# Patient Record
(demographics unavailable — no encounter records)

---

## 2022-06-24 RX ORDER — BUSPIRONE HYDROCHLORIDE 10 MG/1
TABLET ORAL
COMMUNITY

## 2022-06-24 RX ORDER — ALBUTEROL SULFATE 90 UG/1
AEROSOL, METERED RESPIRATORY (INHALATION)
COMMUNITY

## 2022-07-31 LAB
ANION GAP SERPL CALC-SCNC: 9 MMOL/L (ref 2–17)
BASOPHILS # BLD AUTO: 0 X10E3/MCL (ref 0–0.2)
BASOPHILS NFR BLD AUTO: 0.3 % (ref 0–2)
BUN SERPL-MCNC: 16 MG/DL (ref 6–20)
CALCIUM SERPL-MCNC: 8.9 MG/DL (ref 8.6–10)
CHLORIDE SERPL-SCNC: 100 MMOL/L (ref 98–107)
CREAT SERPL-MCNC: 0.6 MG/DL (ref 0.5–1)
DEPRECATED HCO3 PLAS-SCNC: 28 MMOL/L (ref 22–29)
EOSINOPHIL # BLD AUTO: 0 X10E3/MCL (ref 0–0.5)
EOSINOPHIL NFR BLD AUTO: 0 % (ref 0–7)
ERYTHROCYTE [DISTWIDTH] IN BLOOD BY AUTOMATED COUNT: 12.9 % (ref 11–16)
GFR SERPL CREATININE-BSD FRML MDRD: 113 ML/MIN/1.73M²
GLUCOSE SERPL-MCNC: 155 MG/DL (ref 70–99)
HCT VFR BLD AUTO: 40 % (ref 34–47)
HGB BLD-MCNC: 13.2 G/DL (ref 11.5–15.7)
IMMATURE GRANS (ABS): 0.04 X10E3/MCL (ref 0–0.06)
IMMATURE GRANULOCYTES: 0.4 % (ref 0–0.6)
LACTIC ACID: 1.2 MMOL/L (ref 0.5–2)
LYMPHOCYTES # SPEC AUTO: 1.2 X10E3/MCL (ref 1–3.2)
LYMPHOCYTES NFR BLD AUTO: 11.2 % (ref 15–45)
MCH RBC QN AUTO: 28.7 PG (ref 27–34.5)
MCHC RBC AUTO-ENTMCNC: 33 G/DL (ref 32–36)
MCV RBC AUTO: 87 FL (ref 81–99)
MONOCYTES # BLD AUTO: 0.4 X10E3/MCL (ref 0.3–1)
MONOCYTES NFR BLD AUTO: 3.7 % (ref 4–12)
NEUTROPHILS # BLD AUTO: 9.1 X10E3/MCL (ref 1.6–7.3)
NEUTROPHILS NFR BLD AUTO: 84.4 % (ref 42–74)
OSMOLALITY SERPL CALC.SUM OF ELEC: 278 MOSM/KG (ref 270–287)
PLATELET # BLD AUTO: 286 X10E3/MCL (ref 140–440)
PMV BLD AUTO: 11.6 FL (ref 7.2–13.2)
POTASSIUM SERPL-SCNC: 4.1 MMOL/L (ref 3.5–5.3)
PREGNANCY TEST URINE, POC: NEGATIVE
RBC # BLD AUTO: 4.6 X10E6/MCL (ref 3.6–5.2)
SODIUM SERPL-SCNC: 137 MMOL/L (ref 135–145)
WBC # BLD AUTO: 10.8 X10E3/MCL (ref 3.8–10.6)

## 2022-08-01 LAB — FINAL REPORT: NORMAL

## 2022-08-05 LAB — FINAL REPORT: NORMAL

## 2022-10-18 NOTE — ED NOTES
of Problem:   Postoperative nausea and vomiting ; Recorder:   ALYSSA MCKEON JILL A; Confirmation:   Confirmed ; Classification:   Patient Stated ; Code:   [id number] ; Contributor System:   PowerChart ; Last Updated:   2021 11:24 EDT ; Life Cycle Date:   2021 ; Life Cycle Status:   Active ; Vocabulary:   SNOMED CT        Right kidney stone (SNOMED CT  :138730280 )  Name of Problem:   Right kidney stone ; Recorder:   ALYSSA MCKEON JILL A; Confirmation:   Confirmed ; Classification:   Patient Stated ; Code:   [id number] ; Contributor System:   Code Blue ; Last Updated:   2021 11:23 EDT ; Life Cycle Date:   2021 ; Life Cycle Status:   Active ; Vocabulary:   SNOMED CT        Seasonal nasal allergies (SNOMED CT  :AZEGxwEmLzUcjclowKgAAg )  Name of Problem:   Seasonal nasal allergies ; Recorder:   ALYSSA MCKEON JILL A; Confirmation:   Confirmed ; Classification:   Patient Stated ; Code:   AZEGxwEmLzUcjclowKgAAg ; Contributor System:   Code Blue ; Last Updated:   2021 11:23 EDT ; Life Cycle Date:   2021 ; Life Cycle Status:   Active ; Vocabulary:   SNOMED CT          Diagnoses(Active)    Facial swelling  Date:   2022 ; Diagnosis Type:   Reason For Visit ; Confirmation:   Complaint of ; Clinical Dx:   Facial swelling ; Classification:   Medical ; Clinical Service:   Emergency medicine ; Code:   PNED ; Probability:   0 ; Diagnosis Code:   [id number]             -    Procedure History   (As Of: 2022 21:25:10 EDT)     Procedure Dt/Tm:    ; Anesthesia Minutes:   0 ; Procedure Name:    delivery ; Procedure Minutes:   0            Procedure Dt/Tm:   544 ; Anesthesia Minutes:   0 ; Procedure Name:   dilatation and currettage x3 ; Procedure Minutes:   0            Procedure Dt/Tm:   2021 13:27:00 EDT ; Location:   [facility] ; Provider:   Reilly BRADLEY;  Anesthesia Type:   General ; :   Clent Kocher; Anesthesia Minutes:   0 ; Procedure Name:   Lithotripsy (Right) ; Procedure Minutes:   32 ; Comments:     4/16/2021 14:08 EDT - ALYSSA QUIROGA, SOLITARIO AYON  auto-populated from documented surgical case ; Clinical Service:   Surgery            Procedure Dt/Tm:   2014 ; Anesthesia Minutes:   0 ; Procedure Name:   BILATERAL CARPAL [address] ; Procedure Minutes:   0            Procedure Dt/Tm:   2008 ; Anesthesia Minutes:   0 ; Procedure Name:   [address] ; Procedure Minutes:   0            Procedure Dt/Tm:   8/7/2021 11:15:00 EDT ; Location:   [address] [place] [address] ; Provider:   Dinah GO; Anesthesia Type:   General ; :   Marianne MACIEL; Anesthesia Minutes:   0 ; Procedure Name:   Cholecystectomy Laparoscopic ; Procedure Minutes:   75 ; Comments:     8/7/2021 12:39 EDT - Dany Christianson RN, Laquita MARMOLEJO  auto-populated from documented surgical case ; Clinical Service:   Surgery            [facility] Fall Risk Assessment Tool   Hx of falling last 3 months ED Fall :   No   Patient confused or disoriented ED Fall :   No   Patient intoxicated or sedated ED Fall :   No   Patient impaired gait ED Fall :   No   Use a mobility assistance device ED Fall :   No   Patient altered elimination ED Fall :   No   [facility] ED Fall Score :   0    Jolantarudy Little - 7/31/2022 21:23 EDT   ED Advance Directive   Advance Directive :   No   Thong Rosado RN, Shawnee Tyson - 7/31/2022 21:23 EDT   Med Hx   Medication List   (As Of: 7/31/2022 21:25:10 EDT)   Normal Order    iopamidol 61% Inj Soln 100 mL  :   iopamidol 61% Inj Soln 100 mL ; Status:   Ordered ; Ordered As Mnemonic:   Isovue-300 ; Simple Display Line:   100 mL, IV Contrast, Once ; Ordering Provider:   Priscila PILLAI; Catalog Code:   iopamidol ; Order Dt/Tm:   7/31/2022 20:52:21 EDT            Prescription/Discharge Order    EPINEPHrine  :   EPINEPHrine ; Status:   Prescribed ; Ordered As Mnemonic:   EpiPen 2-Lev 0.3 mg injectable kit ;  Simple Display Line:   0.3 mg, IM, Once, PRN: itching/allergic reaction, 1 EA, 0 Refill(s) ; Ordering Provider:   Norma MACIEL; Catalog Code:   EPINEPHrine ; Order Dt/Tm:   7/29/2022 08:53:47 EDT          predniSONE  :   predniSONE ; Status:   Prescribed ; Ordered As Mnemonic:   predniSONE 20 mg oral tablet ; Simple Display Line:   40 mg, 2 tabs, Oral, Daily, for 5 days, 10 tabs, 0 Refill(s) ; Ordering Provider:   Norma MACIEL; Catalog Code:   predniSONE ; Order Dt/Tm:   7/29/2022 08:53:38 EDT            Home Meds    cyanocobalamin  :   cyanocobalamin ; Status:   Documented ; Ordered As Mnemonic:   Vitamin B-12 (cyanocobalamin) 1000 mcg/mL injectable solution ; Simple Display Line:   INJECT ONE ML INTRAMUSCULARLY ONCE A MONTH ; Catalog Code:   cyanocobalamin ; Order Dt/Tm:   7/29/2022 08:45:43 EDT          traZODone  :   traZODone ; Status:   Documented ; Ordered As Mnemonic:   traZODone 50 mg oral tablet ; Simple Display Line:   50 mg, 1 tabs, TAKE 1 OR 2 Tablets BY MOUTH AT BEDTIME AS NEEDED FOR SLEEP ; Catalog Code:   traZODone ; Order Dt/Tm:   7/29/2022 08:45:43 EDT          albuterol  :   albuterol ; Status:   Documented ; Ordered As Mnemonic:   Ventolin HFA 90 mcg/inh inhalation aerosol ; Simple Display Line:   1 puffs, Inhale, Once, PRN: for wheezing, 18 g, 0 Refill(s) ; Ordering Provider:   Zulma Ndiaye; Catalog Code:   albuterol ; Order Dt/Tm:   7/29/2022 08:45:43 EDT          PARoxetine  :   PARoxetine ; Status:   Documented ; Ordered As Mnemonic:   PARoxetine 20 mg oral tablet ; Simple Display Line:   20 mg, 1 tabs, Oral, Daily, 30 tabs, 0 Refill(s) ; Catalog Code:   PARoxetine ; Order Dt/Tm:   7/29/2022 08:45:43 EDT ; Comment:   SOUND ALIKE / LOOK ALIKE - VERIFY DRUG          albuterol  :   albuterol ; Status:   Documented ; Ordered As Mnemonic:   albuterol ; Simple Display Line:   2.5 mg, Inhale, q6hr, PRN: shortness of breath/wheezing, 0 Refill(s) ;  Catalog Code:   albuterol ; Order Dt/Tm:   4/14/2021 11:23:26 EDT

## 2022-10-18 NOTE — ED NOTES
ED Patient Summary       ;          [facility]  [address], [place], [address] [place] [address]  [phone]  Discharge Instructions (Patient)  Jesus Zambrano  :  1977                   MRN: [id number]                   FIN: [id number]  Reason For Visit: Facial swelling; ABCESS IN MOUTH  Final Diagnosis: Abscess of cheek; Dental abscess     Visit Date: 2022 20:18:00  Address: [address] [place] [address].  Phone: [phone]     Emergency Department Providers:         Primary Physician:      [facility] would like to thank you for allowing us to assist you with your healthcare needs. The following includes patient education materials and information regarding your injury/illness. Follow-up Instructions: You were seen today on an emergency basis. Please contact your primary care doctor for a follow up appointment. If you received a referral to a specialist doctor, it is important you follow-up as instructed. It is important that you call your follow-up doctor to schedule and confirm the location of your next appointment. Your doctor may practice at multiple locations. The office location of your follow-up appointment may be different to the one written on your discharge instructions. If you do not have a primary care doctor, please call [phone] for help in finding a Andrea Meza. [facility] Provider. For help in finding a specialist doctor, please call ..[phone]. If your condition gets worse before your follow-up with your primary care doctor or specialist, please return to the Emergency Department. Coronavirus 2019 (COVID-19) Reminders:     Patients age 15 - 24, with parental consent, and over age 25 can make an appointment for a COVID-19 vaccine. Patients can contact their [facility] doctors' offices to schedule an appointment to receive the COVID-19 vaccine. Patients who do not have a John Caldera physician can call [phone] to schedule vaccination appointments. Follow Up Appointments:  Primary Care Provider:     Name: Jules Recinos [facility]     Phone: [phone]                 With: Address: When:   Primary care/ clinic  Within 3 to 5 days   Comments: Thank you so much for visiting with us today. You have had a screening emergency medical exam and to this point, no emergent condition has been identified. It is   important to realize that of course your visit today is simply a moment in time and that your medical condition can certainly change and bcome worse, necessitating re-   evaluation in an urgent or emergent manner. It is your responsibility to seek care in such instances. It is also your responsibility to follow up with your primary care   provider to discuss your Emergency Department visit and to go over any and all testing that was incurred during your Emergency Department visit. We have made you   aware of any pertinent results at this visit, however this may not have been comprehensive. Lack of an acute emergency condition does not mean that there is not a   problem, nor does it replace a thorough exam performed by a primary care physician. It is your responsibility to follow your discharge instructions as given, to follow up   with any other physicians as indicated, and to return to the Emergency Department as instructed. Thank you again for visiting with us today, please let us know if you   have any further questions. Please follow-up with your primary care provider over the next 3-5 days. Please return to the ER if symptoms change, worsen, or for any new concern. With: Address: When:   [address] [place] [address] [address]  [phone] Business (1) Within 2 to 3 days   Comments:   Please refer to the instruction pages in regards to free/low-cost dental clinics.  As we discussed you will have to have the fifth tooth of the mouth removed to prevent further dental abscess. Use antibiotics as prescribed. Continue to use ibuprofen, and your steroids at home. Return to the ER if symptoms change or worsen              [address] SERVICES%>             New Medications  Printed Prescriptions  clindamycin (clindamycin 300 mg oral capsule) 1 Tabs Oral (given by mouth) 4 times a day for 7 Days. Refills: 0. Last Dose:____________________  fluconazole (Diflucan 150 mg oral tablet) 1 Tabs Oral (given by mouth) every week for 2 Weeks. Refills: 0. Last Dose:____________________  Medications that have not changed  Other Medications  albuterol 2.5 Milligram Inhale (breathe in) every 6 hours as needed shortness of breath/wheezing. Last Dose:____________________  albuterol (Ventolin HFA 90 mcg/inh inhalation aerosol) 1 Puffs Inhale (breathe in) once as needed for wheezing. Last Dose:____________________  cyanocobalamin (Vitamin B-12 (cyanocobalamin) 1000 mcg/mL injectable solution) INJECT ONE ML INTRAMUSCULARLY ONCE A MONTH. Last Dose:____________________  EPINEPHrine (EpiPen 2-Lev 0.3 mg injectable kit) 0.3 Milligram Intramuscular (in a muscle) once as needed itching/allergic reaction. Refills: 0. Last Dose:____________________  PARoxetine (PARoxetine 20 mg oral tablet) 1 Tabs Oral (given by mouth) every day., SOUND ALIKE / LOOK ALIKE - VERIFY DRUG  Last Dose:____________________  predniSONE (predniSONE 20 mg oral tablet) 2 Tabs Oral (given by mouth) every day for 5 Days. Refills: 0. Last Dose:____________________  traZODone (traZODone 50 mg oral tablet) 1 Tabs. TAKE 1 OR 2 Tablets BY MOUTH AT BEDTIME AS NEEDED FOR SLEEP.   Last Dose:____________________      Allergy Info: codeine     Discharge Additional Information          Discharge Patient 07/31/22 22:54:00 EDT      Patient Education Materials:                  [facility]  (E.C.C.O) [id number] [facility] [address], [address], [address]  YP-  Monday & Tuesday 10:00am - 4:00pm  Thursday 4:00pm - 7:00pm             Clinics by appointment only    · Lexi Garcia [address]  (also [address] speaking service)   [address] [phone]  [address] employed or residents only (uninsured)  Mon-Thurs, 8:30am-4:00pm.   Fri, 9am-11am (no dentist on site, Q&A session only),  Tue, 3:30pm-8:30pm (emergencies only, first 10 patients only)     · [facility]  [address]  [address] [facility] [address]  [place]-  Thurs, 5pm-9pm, no appointments, seen on order of arrival, extractions only    · Tanner Schroeder [id number] [facility], [phone]; option 1  Mon-Fri, 8:45am-1245pm, except Tue. Hours of operation depend on school semester. After hours emergencies call [phone] ask for dental resident on call. · [facility]  (also [address] speaking service)  [place] [address], [address] [place] [address] [phone]  For children 3-18yrs only without insurance or Medicaid  Contact clinic for hours (hours of operation depend on school  semester), weekends only by appointment    · [address] [facility] 5pm-9pm.  Uninsured and low income residents of [facility],  and [facility]. · [place] [facility] [place] [address] [place]-. Fee based care. Dental Abscess      A dental abscess is an area of pus in or around a tooth. It comes from an infection. It can cause pain and other symptoms. Treatment will help with symptoms and prevent the infection from spreading.       Follow these instructions at home:    Medicines     Take over-the-counter and prescription medicines only as told by your dentist.     If you were prescribed an antibiotic medicine, take it as told by your dentist. Donell Garcia not stop taking it even if you start to feel better. If you were prescribed a gel that has numbing medicine in it, use it exactly as told. Do not drive or use heavy machinery (like a ) while taking prescription pain medicine. General instructions       Rinse out your mouth often with salt water. ? To make salt water, dissolve ??1 tsp of salt in 1 cup of warm water. Eat a soft diet while your mouth is healing. Drink enough fluid to keep your urine pale yellow. Do not apply heat to the outside of your mouth. Do not use any products that contain nicotine or tobacco. These include cigarettes and e-cigarettes. If you need help quitting, ask your doctor. Keep all follow-up visits as told by your dentist. This is important. Prevent an abscess     Brush your teeth every morning and every night. Use fluoride toothpaste. Floss your teeth each day. Get dental cleanings as often as told by your dentist.     Think about getting dental sealant put on teeth that have deep holes (decay). Drink water that has fluoride in it. ? Most tap water has fluoride. ? Check the label on bottled water to see if it has fluoride in it. Drink water instead of sugary drinks. Eat healthy meals and snacks. Wear a mouth guard or face shield when you play sports. Contact a doctor if:     Your pain is worse, and medicine does not help. Get help right away if:     You have a fever or chills. Your symptoms suddenly get worse. You have a very bad headache. You have problems breathing or swallowing. You have trouble opening your mouth. You have swelling in your neck or close to your eye. Summary     A dental abscess is an area of pus in or around a tooth. It is caused by an infection.      Treatment will help with symptoms and prevent the infection from spreading. Take over-the-counter and prescription medicines only as told by your dentist.     To prevent an abscess, take good care of your teeth. Brush your teeth every morning and night. Use floss every day. Get dental cleanings as often as told by your dentist.      This information is not intended to replace advice given to you by your health care provider. Make sure you discuss any questions you have with your health care provider. Document Revised: 07/09/2021 Document Reviewed: 07/09/2021  Bret Patient Education ? [facility]      ---------------------------------------------------------------------------------------------------------------------  [facility] allows patients to review your COVID and other test results as well as discharge documents from any [facility], Emergency Department, surgical center or outpatient lab. Test results are typically available 36 hours after the test is completed. [address] encourages you to self-enroll in the [facility] Patient Portal.     To begin your self-enrollment process, please visit [email or web address] Under [facility], click on Sign up now. NOTE: You must be 16 years and older to use [facility] Self-Enroll online. If you are a parent, caregiver, or guardian; you need an invite to access your childs or dependents health records. To obtain an invite, contact the Medical Records department at [phone] Monday through Friday, 8-4:30, select option 3 . If we receive your call afterhours, we will return your call the next business day. If you have issues trying to create or access your account, contact [employer] at [phone] available 7 days a week 24 hours a day.      Comment:

## 2022-10-18 NOTE — ED NOTES
ED Triage Note       ED Triage Adult Entered On:  7/31/2022 20:30 EDT    Performed On:  7/31/2022 20:26 EDT by Khalif PAIGE               Triage   Numeric Rating Pain Scale :   6   Chief Complaint :   Seen Friday for right sided facial edema. Reports mild improvement since but states she continues to be painful on right cheek bone with \"pus pocket\" inside. No known dental issues. Reports continued blurry vision in right eye.  Airway patent, no distress   [place] [facility]) Mode of Arrival :   Private vehicle   Infectious Disease Documentation :   Document assessment   Temperature Oral :   37.4 degC(Converted to: 99.3 degF)  (HI)    Heart Rate Monitored :   85 bpm   Respiratory Rate :   18 br/min   Systolic Blood Pressure :   150 mmHg (HI)    Diastolic Blood Pressure :   86 mmHg   SpO2 :   97 %   Oxygen Therapy :   Room air   Patient presentation :   None of the above   Chief Complaint or Presentation suggest infection :   Yes   Dosing Weight Obtained By :   Patient stated   Weight Dosing :   130 kg(Converted to: 286 lb 10 oz)    Height :   165 cm(Converted to: 5 ft 5 in)    Body Mass Index Dosing :   48 kg/m2   Khalif PAIGE - 7/31/2022 20:26 EDT   DCP GENERIC CODE   Tracking Acuity :   3   Tracking Group :   ED [facility] Tracking Group   Khalif PAIGE - 7/31/2022 20:26 EDT   ED General Section :   Document assessment   Pregnancy Status :   Patient denies   Last Menstrual Period :   7/29/2022 EDT   ED Allergies Section :   Document assessment   ED Reason for Visit Section :   Document assessment   Khalif PAIGE - 7/31/2022 20:26 EDT   ID Risk Screen Symptoms   Recent Travel History :   No recent travel   TB Symptom Screen :   No symptoms   Last 90 days COVID-19 ID :   No   Close Contact with COVID-19 ID :   No   Last 14 days COVID-19 ID :   No   Khalif PAIGE - 7/31/2022 20:26 EDT   Allergies   (As Of: 7/31/2022 20:30:49 EDT)   Allergies (Active)   codeine  Estimated Onset Date: Unspecified ; Reactions:   Nausea & vomiting ; Created By:   Shai Harrington RN, JILL A; Reaction Status:   Active ; Category:   Drug ; Substance:   codeine ; Type: Allergy ; Severity:   Moderate ; Updated By:   Shai Harrington RN, JILL A; Reviewed Date:   7/29/2022 8:53 EDT      traZODone  Estimated Onset Date:   Unspecified ; Reactions:   Facial swelling ; Created By:   Rip Clay; Reaction Status:   Active ; Category:   Drug ; Substance:   traZODone ; Type: Allergy ; Severity:   Moderate ; Updated By:   Rip Clay; Reviewed Date:   7/29/2022 8:53 EDT        Psycho-Social   Last 3 mo, thoughts killing self/others :   Patient denies   Right click within box for Suspected Abuse policy link. :   None   Feels Safe Where Live :   Yes   ED Behavioral Activity Rating Scale :   4 - Quiet and awake (normal level of activity)   Chelsea PAIGE - 7/31/2022 20:26 EDT   ED Reason for Visit   (As Of: 7/31/2022 20:30:49 EDT)   Problems(Active)    Arthritis (SNOMED CT  :0409911 )  Name of Problem:   Arthritis ; Recorder:   Mimi Roach; Confirmation:   Confirmed ; Classification:   Patient Stated ; Code:   [id number] ; Contributor System:   O-RID ; Last Updated:   8/6/2021 21:45 EDT ; Life Cycle Date:   8/6/2021 ; Life Cycle Status:   Active ; Vocabulary:   SNOMED CT        Asthma (SNOMED CT  :BFFBiLZtpmRlVQP4he55/w )  Name of Problem:   Asthma ; Recorder:   Bryan Akers RN, Bhavesh Dahl; Confirmation:   Confirmed ; Classification:   Medical ; Code:   [id number]/w ; Contributor System:   PowerChart ; Last Updated:   2/8/2018 17:17 EST ; Life Cycle Date:   2/8/2018 ; Life Cycle Status:   Active ; Vocabulary:   SNOMED CT        H/O motion sickness (SNOMED CT  :5930254234 )  Name of Problem:   H/O motion sickness ; Recorder:   ALYSSA MCKEON JILL A; Confirmation:   Confirmed ; Classification:   Patient Stated ; Code:   [id number] ; Contributor System:   PowerChart ; Last Updated:   4/14/2021 11:24 EDT ;  Life Cycle Date:   4/14/2021 ; Life Cycle Status:   Active ; Vocabulary:   SNOMED CT        Motion sickness (IMO  :30761 )  Name of Problem: Motion sickness ; Recorder:   SYSTEM,  SYSTEM; Confirmation:   Confirmed ; Classification:   Patient Stated ; Code:   40574 ; Last Updated:   4/14/2021 11:28 EDT ; Life Cycle Date:   4/14/2021 ; Life Cycle Status:   Active ; Vocabulary:   IMO        Postoperative nausea and vomiting (SNOMED CT  :1814886 )  Name of Problem:   Postoperative nausea and vomiting ; Recorder:   ALYSSA MCKEON JILL A; Confirmation:   Confirmed ; Classification:   Patient Stated ; Code:   [id number] ; Contributor System:   [employer] ; Last Updated:   4/14/2021 11:24 EDT ; Life Cycle Date:   4/14/2021 ; Life Cycle Status:   Active ; Vocabulary:   SNOMED CT        Right kidney stone (SNOMED CT  :102131533 )  Name of Problem:   Right kidney stone ; Recorder:   ALYSSA MCKEON JILL A; Confirmation:   Confirmed ; Classification:   Patient Stated ; Code:   [id number] ; Contributor System:   PowerChart ; Last Updated:   4/14/2021 11:23 EDT ; Life Cycle Date:   4/14/2021 ; Life Cycle Status:   Active ; Vocabulary:   SNOMED CT        Seasonal nasal allergies (SNOMED CT  :AZEGxwEmLzUcjclowKgAAg )  Name of Problem:   Seasonal nasal allergies ; Recorder:   ALYSSA MCKEON JILL A; Confirmation:   Confirmed ; Classification:   Patient Stated ; Code:   AZEGxwEmLzUcjclowKgAAg ; Contributor System:   PowerChart ; Last Updated:   4/14/2021 11:23 EDT ;  Life Cycle Date:   4/14/2021 ; Life Cycle Status:   Active ; Vocabulary:   SNOMED CT          Diagnoses(Active)    Facial swelling  Date:   7/31/2022 ; Diagnosis Type:   Reason For Visit ; Confirmation:   Complaint of ; Clinical Dx:   Facial swelling ; Classification:   Medical ; Clinical Service:   Emergency medicine ; Code:   PNED ; Probability:   0 ; Diagnosis Code:   [id number]

## 2022-10-18 NOTE — ED NOTES
symptoms. Treatment will help with symptoms and prevent the infection from spreading. Follow these instructions at home:    Medicines     Take over-the-counter and prescription medicines only as told by your dentist.     If you were prescribed an antibiotic medicine, take it as told by your dentist. Shelley Tripathi not stop taking it even if you start to feel better. If you were prescribed a gel that has numbing medicine in it, use it exactly as told. Do not drive or use heavy machinery (like a ) while taking prescription pain medicine. General instructions       Rinse out your mouth often with salt water. ? To make salt water, dissolve ??1 tsp of salt in 1 cup of warm water. Eat a soft diet while your mouth is healing. Drink enough fluid to keep your urine pale yellow. Do not apply heat to the outside of your mouth. Do not use any products that contain nicotine or tobacco. These include cigarettes and e-cigarettes. If you need help quitting, ask your doctor. Keep all follow-up visits as told by your dentist. This is important. Prevent an abscess     Brush your teeth every morning and every night. Use fluoride toothpaste. Floss your teeth each day. Get dental cleanings as often as told by your dentist.     Think about getting dental sealant put on teeth that have deep holes (decay). Drink water that has fluoride in it. ? Most tap water has fluoride. ? Check the label on bottled water to see if it has fluoride in it. Drink water instead of sugary drinks. Eat healthy meals and snacks. Wear a mouth guard or face shield when you play sports. Contact a doctor if:     Your pain is worse, and medicine does not help. Get help right away if:     You have a fever or chills. Your symptoms suddenly get worse. You have a very bad headache. You have problems breathing or swallowing. You have trouble opening your mouth.      You have swelling in your neck or close to your eye. Summary     A dental abscess is an area of pus in or around a tooth. It is caused by an infection. Treatment will help with symptoms and prevent the infection from spreading. Take over-the-counter and prescription medicines only as told by your dentist.     To prevent an abscess, take good care of your teeth. Brush your teeth every morning and night. Use floss every day. Get dental cleanings as often as told by your dentist.      This information is not intended to replace advice given to you by your health care provider. Make sure you discuss any questions you have with your health care provider. Document Revised: 07/09/2021 Document Reviewed: 07/09/2021  [facility] Patient Education ?  [address] [address] [place].

## 2022-10-18 NOTE — DISCHARGE SUMMARY
Tests and Procedures: The following procedures and tests were performed during your ED visit. COMMONPROCEDURES%>  COMMON PROCEDURESCOMMENTS%>          Laboratory Orders  Name Status Details   . Preg U POC Completed Urine, RT, RT - Routine, Collected, 07/31/22 21:52:00 EDT, Nurse collect, 07/31/22 21:52:00 /[place], [facility] POC Login   BMP Completed Blood, Stat, ST - Stat, 07/31/22 20:52:00 EDT, 07/31/22 20:52:00 EDT, Nurse collect, Pearley Bussing E-PAC, Print label Y/N   C Blood Ordered Blood, Stat, ST - Stat, 07/31/22 20:52:00 EDT, 07/31/22 20:52:00 EDT, Nurse collect, 1 of 2   C Blood Ordered Blood, Stat, ST - Stat, 07/31/22 20:52:00 EDT, 07/31/22 20:52:00 EDT, Nurse collect, 2 of 2   CBCDIFF Completed Blood, Stat, ST - Stat, 07/31/22 20:52:00 EDT, 07/31/22 20:52:00 EDT, Nurse collect, Pearley Bussing E-PAC, Print label Y/N   Lactic Completed Blood, Stat, ST - Stat, 07/31/22 20:52:00 EDT, 07/31/22 20:52:00 EDT, Nurse collect, Pearley Bussing E-PAC, Print label Y/N               Radiology Orders  Name Status Details   CT Maxillofacial w/ Contrast Ordered 07/31/22 20:52:00 EDT, STAT 1 hour or less, Reason: Mass, lump or swelling, maxface, Reason: right maxillofacial swelling, Transport Mode: STRETCHER, Rad Type, pp_set_radiology_subspecialty               Patient Care Orders  Name Status Details   Discharge Patient Ordered 07/31/22 22:54:00 EDT   ED Assessment Adult Completed 07/31/22 20:30:50 EDT, 07/31/22 20:30:50 EDT   ED Secondary Triage Completed 07/31/22 20:30:50 EDT, 07/31/22 20:30:50 EDT   ED Triage Adult Completed 07/31/22 20:18:43 EDT, 07/31/22 20:18:43 EDT   POC-Urine Pregnancy Test collect Completed 07/31/22 20:55:00 EDT, Once, 07/31/22 20:55:00 EDT   Saline Lock Insert Completed 07/31/22 20:52:00 EDT, Once, 07/31/22 20:52:00 EDT             PROVIDER INFORMATION               Provider Role Assigned Priya Monge RN, Keisha Clemente ED Nurse 7/31/2022 20:31:58    Kirk Celestin E-PAC ED MidLevel 7/31/2022 20:41:50        Attending Physician:  DEFAULT,  DOCTOR     Admit Doc  DEFAULT,  DOCTOR     Consulting Doc       VITALS INFORMATION  Vital Sign Triage Latest   Temp Oral ORAL_1%>37.4 degC ORAL%>37.4 degC   Temp Temporal TEMPORAL_1%> TEMPORAL%>   Temp Intravascular INTRAVASCULAR_1%> INTRAVASCULAR%>   Temp Axillary AXILLARY_1%> AXILLARY%>   Temp Rectal RECTAL_1%> RECTAL%>   02 Sat 97 % 100 %   Respiratory Rate RATE_1%>18 br/min RATE%>18 br/min   Peripheral Pulse Rate PULSE RATE_1%> PULSE RATE%>   Apical Heart Rate HEART RATE_1%> HEART RATE%>   Blood Pressure BLOOD PRESSURE_1%>/ BLOOD PRESSURE_1%>86 mmHg BLOOD PRESSURE%>137 mmHg / BLOOD PRESSURE%>74 mmHg                 Immunizations      No Immunizations Documented This Visit          DISCHARGE INFORMATION   Discharge Disposition: H Outpt-Sent Home   Discharge Location:    Home   Discharge Date and Time:    7/31/2022 23:13:43   ED Checkout Date and Time:    7/31/2022 23:13:43     DEPART REASON INCOMPLETE INFORMATION               Depart Action Incomplete Reason   Interactive View/I&O MD Decision to leave incomplete               Problems      Active           Asthma              Smoking Status      Never smoker         PATIENT EDUCATION INFORMATION  Instructions:       Free Dental Clinics (Custom); Dental Abscess, Easy-to-Read     Follow up:                    With: Address: When:   Primary care/ clinic  Within 3 to 5 days   Comments: Thank you so much for visiting with us today. You have had a screening emergency medical exam and to this point, no emergent condition has been identified. It is   important to realize that of course your visit today is simply a moment in time and that your medical condition can certainly change and bcome worse, necessitating re-   evaluation in an urgent or emergent manner. It is your responsibility to seek care in such instances.  It is also your responsibility to follow up with your primary care   provider to discuss your Emergency Department visit and to go over any and all testing that was incurred during your Emergency Department visit. We have made you   aware of any pertinent results at this visit, however this may not have been comprehensive. Lack of an acute emergency condition does not mean that there is not a   problem, nor does it replace a thorough exam performed by a primary care physician. It is your responsibility to follow your discharge instructions as given, to follow up   with any other physicians as indicated, and to return to the Emergency Department as instructed. Thank you again for visiting with us today, please let us know if you   have any further questions. Please follow-up with your primary care provider over the next 3-5 days. Please return to the ER if symptoms change, worsen, or for any new concern. With: Address: When:   [id number] [place] [phone] Business (1) Within 2 to 3 days   Comments:   Please refer to the instruction pages in regards to free/low-cost dental clinics. As we discussed you will have to have the fifth tooth of the mouth removed to prevent further dental abscess. Use antibiotics as prescribed. Continue to use ibuprofen, and your steroids at home.  Return to the ER if symptoms change or worsen           ED PROVIDER DOCUMENTATION

## 2022-10-18 NOTE — ED PROVIDER NOTES
Facial swelling        Patient:   Brent [place]            MRN: [id number]            FIN: [id number]               Age:   39 years     Sex:  Female     :  1977   Associated Diagnoses:   Dental abscess; Abscess of cheek   Author:   Mimi Veras E-PAC      Basic Information   Time seen: Provider Seen (ST)   ED Provider/Time:    Mimi MARMOLEJO-PAC / 2022 20:41  . Additional information: Chief Complaint from Nursing Triage Note   Chief Complaint  Chief Complaint: Seen Friday for right sided facial edema. Reports mild improvement since but states she continues to be painful on right cheek bone with \"pus pocket\" inside. No known dental issues. Reports continued blurry vision in right eye. Airway patent, no distress (22 20:26:00). History of Present Illness   26-year-old female presents ER today for secondary evaluation after being seen here 2 days prior, with which she states has full right-sided facial swelling, attributed to a drug interaction. The patient states she has noticed the swelling improved however she still feels a pocket of swelling inferior to the right eye. She also states that she has noticed swelling and pain of the right buccal region of the oral cavity. She denies any fevers, chills, diaphoresis at home. She is denying any extraocular movement difficulties, continued facial swelling, neck swelling, or shortness of breath. She denies any trismus. The patient states she has been taking prednisone and ibuprofen at home for relief. She is denying any tooth pain. Denies any smoking or IV drug use. No known drug allergies. .        Review of Systems   Constitutional symptoms:  Negative except as documented in HPI. Respiratory symptoms:  Negative except as documented in HPI. Cardiovascular symptoms:  Negative except as documented in HPI. Additional review of systems information: All other systems reviewed and otherwise negative.       Health Status Allergies: Allergic Reactions (Selected)  Moderate  Codeine- Nausea & vomiting. .   Medications:  (Selected)   Prescriptions  Prescribed  EpiPen 2-Lev 0.3 mg injectable kit: 0.3 mg, IM, Once, PRN: itching/allergic reaction, 1 EA, 0 Refill(s)  predniSONE 20 mg oral tablet: 40 mg, 2 tabs, Oral, Daily, for 5 days, 10 tabs, 0 Refill(s)  Documented Medications  Documented  PARoxetine 20 mg oral tablet: 20 mg, 1 tabs, Oral, Daily, 30 tabs, 0 Refill(s)  Ventolin HFA 90 mcg/inh inhalation aerosol: 1 puffs, Inhale, Once, PRN: for wheezing, 18 g, 0 Refill(s)  Vitamin B-12 (cyanocobalamin) 1000 mcg/mL injectable solution: INJECT ONE ML INTRAMUSCULARLY ONCE A MONTH  albuterol: 2.5 mg, Inhale, q6hr, PRN: shortness of breath/wheezing, 0 Refill(s)  traZODone 50 mg oral tablet: 50 mg, 1 tabs, TAKE 1 OR 2 Tablets BY MOUTH AT BEDTIME AS NEEDED FOR SLEEP. Past Medical/ Family/ Social History   Medical history: Reviewed as documented in chart. Surgical history: Reviewed as documented in chart. Family history: Not significant. Social history: Reviewed as documented in chart. Problem list:    Active Problems (7)  Arthritis   Asthma   H/O motion sickness   Motion sickness   Postoperative nausea and vomiting   Right kidney stone   Seasonal nasal allergies   . Physical Examination               Vital Signs   Vital Signs   2/59/8603 40:72 EDT Systolic Blood Pressure 927 mmHg    Diastolic Blood Pressure 76 mmHg    Heart Rate Monitored 68 bpm    Respiratory Rate 16 br/min    SpO2 99 %   9/40/8956 36:52 EDT Systolic Blood Pressure 302 mmHg  HI    Diastolic Blood Pressure 86 mmHg    Temperature Oral 37.4 degC  HI    Heart Rate Monitored 85 bpm    Respiratory Rate 18 br/min    SpO2 97 %   . Measurements   7/31/2022 20:30 EDT Body Mass Index est nelson 47.75 kg/m2    Body Mass Index Measured 47.75 kg/m2   7/31/2022 20:26 EDT Height/Length Measured 165 cm    Weight Dosing 130 kg   .    General:  Alert, no acute distress, not anxious, not ill-appearing. Skin:  Warm. Head:  Normocephalic, atraumatic, Mild right maxillary facial swelling, induration deep to the maxillofacial region inferior to the right eye. No erythema or excessive warmth negative for lymphadenopathy, or neck swelling. Neck:  Supple, trachea midline. Eye:  Extraocular movements are intact, normal conjunctiva. Ears, nose, mouth and throat:  Oral mucosa moist, no pharyngeal erythema or exudate, Patient does have a dental abscess that was of tooth for, and of the school region of the right cheek. Airway is patent negative for trismus. Cardiovascular:  Regular rate and rhythm, No murmur, Normal peripheral perfusion. Respiratory:  Lungs are clear to auscultation, respirations are non-labored, breath sounds are equal, Symmetrical chest wall expansion. Chest wall:  No deformity. Musculoskeletal:  Normal ROM. Gastrointestinal:  Non distended. Psychiatric:  Cooperative, appropriate mood & affect. Medical Decision Making   Rationale:  PA/NP reviewed with co-signing physician: ECG, lab results, radiology studies, medication, diagnosis, and plan of care. Documents reviewed:  Emergency department nurses' notes, flowsheet, emergency department records, prior records, vital signs. Radiology results:  Rad Results (ST)   Patient has periapical lucency involving tooth #5 within the right maxilla consistent with a periapical tooth abscess. Additionally a portion of the tooth root extends across the lateral maxillary cortex into the adjacent soft tissues. There is subperiosteal abscess adjacent to the protruding tooth on the buccal side of the mucosa measuring 1.1 x 2.2 x 1.8 cm. .   Notes:  69-year-old female presents the ER today for continued right maxillofacial swelling, buccal swelling of the right mouth after being seen here 2 days prior.   Physical exam shows the patient in no acute distress, non-ill-appearing, nontoxic-appearing, with stable vital signs. She is afebrile nontachycardic. Based on the patient's previous visitation, and stated right facial swelling, a full work-up will be conducted including CT with contrast of the maxillofacial region patient's CBC shows mild leukocytosis, negative lactic acid. Patient is visualized to have dental abscess on exam, started on IV Unasyn. CT of the abdomen pelvis does show subperiosteal abscess, and periapical tooth abscess. She will continue her steroid treatment at home, ibuprofen, and is given prescription for clindamycin use at home. I did stress the importance of follow-up with her dentist for tooth removal.  She is to return to the ER if symptoms change or worsen. .      Impression and Plan   Diagnosis   Dental abscess (VPT77-TU K04.7, Discharge, Medical)   Abscess of cheek (VTN74-BV L02.01, Discharge, Medical)   Plan   Condition: Stable. Disposition: Discharged: to home. Prescriptions: Launch prescriptions   Pharmacy:  clindamycin 300 mg oral capsule (Prescribe): 1 tabs, Oral, QID, for 7 days, 28 tabs, 0 Refill(s), [facility] prescriptions   Pharmacy:  Diflucan 150 mg oral tablet (Prescribe): 150 mg, 1 tabs, Oral, qWeek, for 2 weeks, 2 tabs, 0 Refill(s). Patient was given the following educational materials: Dental Abscess, Easy-to-Read, Free Dental Clinics (Custom). Follow up with: [id number] Within 2 to 3 days Please refer to the instruction pages in regards to free/low-cost dental clinics. As we discussed you will have to have the fifth tooth of the mouth removed to prevent further dental abscess. Use antibiotics as prescribed. Continue to use ibuprofen, and your steroids at home. Return to the ER if symptoms change or worsen; Primary care/ clinic Within 3 to 5 days Thank you so much for visiting with us today. You have had a screening emergency medical exam and to this point, no emergent condition has been identified.   It is  important to realize that of course your visit today is simply a moment in time and that your medical condition can certainly change and bcome worse, necessitating re-  evaluation in an urgent or emergent manner. It is your responsibility to seek care in such instances. It is also your responsibility to follow up with your primary care   provider to discuss your Emergency Department visit and to go over any and all testing that was incurred during your Emergency Department visit. We have made you   aware of any pertinent results at this visit, however this may not have been comprehensive. Lack of an acute emergency condition does not mean that there is not a   problem, nor does it replace a thorough exam performed by a primary care physician. It is your responsibility to follow your discharge instructions as given, to follow up   with any other physicians as indicated, and to return to the Emergency Department as instructed. Thank you again for visiting with us today, please let us know if you   have any further questions. Please follow-up with your primary care provider over the next 3-5 days. Please return to the ER if symptoms change, worsen, or for any new concern. .    Counseled: Patient, Family, Regarding diagnosis, Regarding diagnostic results, Regarding treatment plan, Patient indicated understanding of instructions.     Signature Line     Electronically Signed on 08/01/2022 12:58 AM EDT   ________________________________________________   Kirk MARMOLEJO-PAC      Electronically Signed on 08/02/2022 07:47 PM EDT   ________________________________________________   Francy DUMONT            Modified by: Kirk MARMOLEJO-PAC on 07/31/2022 11:04 PM EDT      Modified by: Kirk MARMOLEJO-PAC on 08/01/2022 12:58 AM EDT